# Patient Record
Sex: FEMALE | Race: WHITE | NOT HISPANIC OR LATINO | URBAN - METROPOLITAN AREA
[De-identification: names, ages, dates, MRNs, and addresses within clinical notes are randomized per-mention and may not be internally consistent; named-entity substitution may affect disease eponyms.]

---

## 2020-07-25 ENCOUNTER — TELEPHONE ENCOUNTER (OUTPATIENT)
Dept: URBAN - METROPOLITAN AREA CLINIC 13 | Facility: CLINIC | Age: 55
End: 2020-07-25

## 2020-07-26 ENCOUNTER — TELEPHONE ENCOUNTER (OUTPATIENT)
Dept: URBAN - METROPOLITAN AREA CLINIC 13 | Facility: CLINIC | Age: 55
End: 2020-07-26

## 2020-07-26 RX ORDER — IBUPROFEN 200 MG/1
TAKE 1 TABLET EVERY 6 TO 8 HOURS AS NEEDED TABLET, COATED ORAL
Refills: 0 | Status: ACTIVE | COMMUNITY

## 2020-07-26 RX ORDER — ESTRADIOL 0.1 MG/G
INSERT 1/4 APPLICATORFUL (1GM) VAGINALLY TWICE WEEKLY CREAM VAGINAL
Refills: 0 | Status: ACTIVE | COMMUNITY

## 2020-07-26 RX ORDER — ZOLPIDEM TARTRATE 1.75 MG/1
PLACE 1 TABLET BEDTIME PRN TABLET SUBLINGUAL
Refills: 0 | Status: ACTIVE | COMMUNITY

## 2022-04-14 ENCOUNTER — LAB OUTSIDE AN ENCOUNTER (OUTPATIENT)
Dept: URBAN - METROPOLITAN AREA CLINIC 113 | Facility: CLINIC | Age: 57
End: 2022-04-14

## 2022-04-14 ENCOUNTER — WEB ENCOUNTER (OUTPATIENT)
Dept: URBAN - METROPOLITAN AREA CLINIC 113 | Facility: CLINIC | Age: 57
End: 2022-04-14

## 2022-04-14 ENCOUNTER — OFFICE VISIT (OUTPATIENT)
Dept: URBAN - METROPOLITAN AREA CLINIC 113 | Facility: CLINIC | Age: 57
End: 2022-04-14
Payer: COMMERCIAL

## 2022-04-14 VITALS
BODY MASS INDEX: 24.66 KG/M2 | RESPIRATION RATE: 20 BRPM | HEART RATE: 83 BPM | TEMPERATURE: 98.4 F | DIASTOLIC BLOOD PRESSURE: 64 MMHG | WEIGHT: 134 LBS | HEIGHT: 62 IN | SYSTOLIC BLOOD PRESSURE: 104 MMHG

## 2022-04-14 DIAGNOSIS — R19.4 CHANGE IN BOWEL HABIT: ICD-10-CM

## 2022-04-14 PROCEDURE — 99204 OFFICE O/P NEW MOD 45 MIN: CPT | Performed by: INTERNAL MEDICINE

## 2022-04-14 RX ORDER — ZOLPIDEM TARTRATE 1.75 MG/1
PLACE 1 TABLET BEDTIME PRN TABLET SUBLINGUAL
Refills: 0 | Status: ON HOLD | COMMUNITY

## 2022-04-14 RX ORDER — IBUPROFEN 200 MG/1
TAKE 1 TABLET EVERY 6 TO 8 HOURS AS NEEDED TABLET, COATED ORAL
Refills: 0 | Status: ACTIVE | COMMUNITY

## 2022-04-14 RX ORDER — ESTRADIOL 0.1 MG/G
_INSERT 1/4 APPLICATORFUL (1GM) VAGINALLY TWICE WEEKLY CREAM VAGINAL
Refills: 0 | Status: ACTIVE | COMMUNITY

## 2022-04-14 RX ORDER — ATORVASTATIN CALCIUM 10 MG/1
1 TABLET TABLET, FILM COATED ORAL
Status: ACTIVE | COMMUNITY

## 2022-04-14 NOTE — HPI-TODAY'S VISIT:
The patient is a 56-year-old female who was last seen here at the time of her routine screening colonoscopy done on April 4, 2016 which was a normal examination.  She has no family history of colon cancer.  Her last prior colonoscopy have been a normal examination 10 years earlier.   The patient presents today with a new complaint.  Since October, she has had a change in bowel habits, with a shift towards constipation.  Whereby she previously had a bowel movement every day like clockwork, she is now having bowel movements sometimes only every 4 days or so.  She denies any associated rectal bleeding, nausea vomiting, fever, chills, sweats, narrow caliber stools, etc.  She started some Metamucil in January, and although her symptoms improved, they have not resolved.  When she saw her primary care physician, they recommended that she consider further investigation in our office.

## 2022-04-22 ENCOUNTER — OFFICE VISIT (OUTPATIENT)
Dept: URBAN - METROPOLITAN AREA SURGERY CENTER 25 | Facility: SURGERY CENTER | Age: 57
End: 2022-04-22
Payer: COMMERCIAL

## 2022-04-22 DIAGNOSIS — R19.4 CHANGE IN BOWEL HABIT: ICD-10-CM

## 2022-04-22 PROCEDURE — 45378 DIAGNOSTIC COLONOSCOPY: CPT | Performed by: INTERNAL MEDICINE

## 2022-04-22 PROCEDURE — G8907 PT DOC NO EVENTS ON DISCHARG: HCPCS | Performed by: INTERNAL MEDICINE

## 2022-04-22 RX ORDER — IBUPROFEN 200 MG/1
TAKE 1 TABLET EVERY 6 TO 8 HOURS AS NEEDED TABLET, COATED ORAL
Refills: 0 | Status: ACTIVE | COMMUNITY

## 2022-04-22 RX ORDER — ATORVASTATIN CALCIUM 10 MG/1
1 TABLET TABLET, FILM COATED ORAL
Status: ACTIVE | COMMUNITY

## 2022-04-22 RX ORDER — ESTRADIOL 0.1 MG/G
_INSERT 1/4 APPLICATORFUL (1GM) VAGINALLY TWICE WEEKLY CREAM VAGINAL
Refills: 0 | Status: ACTIVE | COMMUNITY

## 2022-04-22 RX ORDER — ZOLPIDEM TARTRATE 1.75 MG/1
PLACE 1 TABLET BEDTIME PRN TABLET SUBLINGUAL
Refills: 0 | Status: ON HOLD | COMMUNITY

## 2022-04-25 ENCOUNTER — WEB ENCOUNTER (OUTPATIENT)
Dept: URBAN - METROPOLITAN AREA CLINIC 113 | Facility: CLINIC | Age: 57
End: 2022-04-25

## 2022-06-06 ENCOUNTER — OFFICE VISIT (OUTPATIENT)
Dept: URBAN - METROPOLITAN AREA CLINIC 113 | Facility: CLINIC | Age: 57
End: 2022-06-06

## 2022-06-08 ENCOUNTER — CLAIMS CREATED FROM THE CLAIM WINDOW (OUTPATIENT)
Dept: URBAN - METROPOLITAN AREA CLINIC 113 | Facility: CLINIC | Age: 57
End: 2022-06-08
Payer: COMMERCIAL

## 2022-06-08 ENCOUNTER — DASHBOARD ENCOUNTERS (OUTPATIENT)
Age: 57
End: 2022-06-08

## 2022-06-08 ENCOUNTER — TELEPHONE ENCOUNTER (OUTPATIENT)
Dept: URBAN - METROPOLITAN AREA CLINIC 113 | Facility: CLINIC | Age: 57
End: 2022-06-08

## 2022-06-08 VITALS
HEIGHT: 62 IN | WEIGHT: 133 LBS | BODY MASS INDEX: 24.48 KG/M2 | HEART RATE: 80 BPM | DIASTOLIC BLOOD PRESSURE: 64 MMHG | RESPIRATION RATE: 18 BRPM | SYSTOLIC BLOOD PRESSURE: 96 MMHG | TEMPERATURE: 97.5 F

## 2022-06-08 DIAGNOSIS — Z12.11 COLON CANCER SCREENING: ICD-10-CM

## 2022-06-08 DIAGNOSIS — R19.4 CHANGE IN BOWEL HABIT: ICD-10-CM

## 2022-06-08 DIAGNOSIS — K59.09 OTHER CONSTIPATION: ICD-10-CM

## 2022-06-08 PROBLEM — 88111009 CHANGE IN BOWEL HABIT: Status: ACTIVE | Noted: 2022-06-08

## 2022-06-08 PROBLEM — 14760008: Status: ACTIVE | Noted: 2022-06-08

## 2022-06-08 PROBLEM — 305058001: Status: ACTIVE | Noted: 2022-06-08

## 2022-06-08 PROCEDURE — 99213 OFFICE O/P EST LOW 20 MIN: CPT | Performed by: NURSE PRACTITIONER

## 2022-06-08 PROCEDURE — 99213 OFFICE O/P EST LOW 20 MIN: CPT | Performed by: INTERNAL MEDICINE

## 2022-06-08 RX ORDER — IBUPROFEN 200 MG/1
TAKE 1 TABLET EVERY 6 TO 8 HOURS AS NEEDED TABLET, COATED ORAL
Refills: 0 | Status: ACTIVE | COMMUNITY

## 2022-06-08 RX ORDER — ATORVASTATIN CALCIUM 10 MG/1
1 TABLET TABLET, FILM COATED ORAL
Status: ACTIVE | COMMUNITY

## 2022-06-08 RX ORDER — ESTRADIOL 0.1 MG/G
_INSERT 1/4 APPLICATORFUL (1GM) VAGINALLY TWICE WEEKLY CREAM VAGINAL
Refills: 0 | Status: ACTIVE | COMMUNITY

## 2022-06-08 RX ORDER — ZOLPIDEM TARTRATE 1.75 MG/1
PLACE 1 TABLET BEDTIME PRN TABLET SUBLINGUAL
Refills: 0 | Status: ACTIVE | COMMUNITY

## 2022-06-08 NOTE — HPI-TODAY'S VISIT:
This is a 56-year-old female with a history of hyperlipidemia and a constipation predominant change in bowel habits presenting for follow-up after a colonoscopy. She was last seen 4/14/2022 reporting a 6-month history of a change in bowel habits toward constipation described as intermittent bowel movements occasionally occurring as long as every 4 days with a baseline history of daily stools.  She started Metamucil 3 months prior to her visit and symptoms had improved but not resolved.  She was instructed to continue fiber and add daily MiraLAX.  She was scheduled for colonoscopy.  She benefited from taking daily fiber.  She has not used it since the colonoscopy.  She reports her bowels have been moving regularly without the use of fiber.  She infrequently has "indigestion" and denies heartburn, regurgitation, or dysphagia.  She does not consider this to be a problem.  She denies any other abdominal symptoms.

## 2022-06-08 NOTE — HPI-OTHER HISTORIES
Colonoscopy 4/22/2022:BB PS 9, entire examined colon and terminal ileum were normal.  Colonoscopy for screening recommended in 2032.

## 2023-06-20 ENCOUNTER — TELEPHONE ENCOUNTER (OUTPATIENT)
Dept: URBAN - METROPOLITAN AREA CLINIC 113 | Facility: CLINIC | Age: 58
End: 2023-06-20